# Patient Record
Sex: FEMALE | Race: WHITE | Employment: OTHER | RURAL
[De-identification: names, ages, dates, MRNs, and addresses within clinical notes are randomized per-mention and may not be internally consistent; named-entity substitution may affect disease eponyms.]

---

## 2019-07-18 VITALS
WEIGHT: 176 LBS | HEART RATE: 85 BPM | DIASTOLIC BLOOD PRESSURE: 74 MMHG | BODY MASS INDEX: 32.39 KG/M2 | HEIGHT: 62 IN | SYSTOLIC BLOOD PRESSURE: 130 MMHG

## 2019-07-18 DIAGNOSIS — Z85.118 HISTORY OF LUNG CANCER: ICD-10-CM

## 2019-08-07 LAB
ALBUMIN SERPL-MCNC: 3.8 G/DL
ALP BLD-CCNC: 53 U/L
ALT SERPL-CCNC: 22 U/L
ANION GAP SERPL CALCULATED.3IONS-SCNC: ABNORMAL MMOL/L
AST SERPL-CCNC: 14 U/L
BASOPHILS ABSOLUTE: ABNORMAL /ΜL
BASOPHILS RELATIVE PERCENT: ABNORMAL %
BILIRUB SERPL-MCNC: 0.3 MG/DL (ref 0.1–1.4)
BUN BLDV-MCNC: 23 MG/DL
CALCIUM SERPL-MCNC: 9.4 MG/DL
CHLORIDE BLD-SCNC: 104 MMOL/L
CO2: 29 MMOL/L
CREAT SERPL-MCNC: 1.1 MG/DL
EOSINOPHILS ABSOLUTE: ABNORMAL /ΜL
EOSINOPHILS RELATIVE PERCENT: ABNORMAL %
GFR CALCULATED: 50
GLUCOSE BLD-MCNC: 102 MG/DL
HCT VFR BLD CALC: 36.2 % (ref 36–46)
HEMOGLOBIN: 11.9 G/DL (ref 12–16)
LYMPHOCYTES ABSOLUTE: ABNORMAL /ΜL
LYMPHOCYTES RELATIVE PERCENT: ABNORMAL %
MCH RBC QN AUTO: ABNORMAL PG
MCHC RBC AUTO-ENTMCNC: ABNORMAL G/DL
MCV RBC AUTO: 95 FL
MONOCYTES ABSOLUTE: ABNORMAL /ΜL
MONOCYTES RELATIVE PERCENT: ABNORMAL %
NEUTROPHILS ABSOLUTE: 3.8 /ΜL
NEUTROPHILS RELATIVE PERCENT: ABNORMAL %
PDW BLD-RTO: ABNORMAL %
PLATELET # BLD: 228 K/ΜL
PMV BLD AUTO: ABNORMAL FL
POTASSIUM SERPL-SCNC: 4.2 MMOL/L
RBC # BLD: 3.81 10^6/ΜL
SODIUM BLD-SCNC: 139 MMOL/L
TOTAL PROTEIN: 9.4
WBC # BLD: 5.3 10^3/ML
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
## 2019-08-13 ENCOUNTER — OFFICE VISIT (OUTPATIENT)
Dept: HEMATOLOGY | Age: 69
End: 2019-08-13
Payer: MEDICARE

## 2019-08-13 VITALS
BODY MASS INDEX: 31.65 KG/M2 | HEIGHT: 62 IN | WEIGHT: 172 LBS | DIASTOLIC BLOOD PRESSURE: 80 MMHG | SYSTOLIC BLOOD PRESSURE: 160 MMHG | HEART RATE: 75 BPM | OXYGEN SATURATION: 97 %

## 2019-08-13 DIAGNOSIS — Z00.00 HEALTHCARE MAINTENANCE: ICD-10-CM

## 2019-08-13 DIAGNOSIS — Z85.118 HISTORY OF LUNG CANCER: Primary | ICD-10-CM

## 2019-08-13 DIAGNOSIS — D47.2 MGUS (MONOCLONAL GAMMOPATHY OF UNKNOWN SIGNIFICANCE): ICD-10-CM

## 2019-08-13 PROCEDURE — 99214 OFFICE O/P EST MOD 30 MIN: CPT | Performed by: INTERNAL MEDICINE

## 2019-10-15 ENCOUNTER — TRANSCRIBE ORDERS (OUTPATIENT)
Dept: ADMINISTRATIVE | Facility: HOSPITAL | Age: 69
End: 2019-10-15

## 2019-10-15 ENCOUNTER — OFFICE VISIT (OUTPATIENT)
Dept: HEMATOLOGY | Age: 69
End: 2019-10-15
Payer: MEDICARE

## 2019-10-15 VITALS
BODY MASS INDEX: 32.02 KG/M2 | DIASTOLIC BLOOD PRESSURE: 82 MMHG | WEIGHT: 174 LBS | HEART RATE: 91 BPM | HEIGHT: 62 IN | SYSTOLIC BLOOD PRESSURE: 140 MMHG

## 2019-10-15 DIAGNOSIS — Z85.118 HISTORY OF LUNG CANCER: Primary | ICD-10-CM

## 2019-10-15 DIAGNOSIS — Z08 ENCOUNTER FOR FOLLOW-UP SURVEILLANCE OF LUNG CANCER: ICD-10-CM

## 2019-10-15 DIAGNOSIS — D47.2 MGUS (MONOCLONAL GAMMOPATHY OF UNKNOWN SIGNIFICANCE): ICD-10-CM

## 2019-10-15 DIAGNOSIS — Z00.00 HEALTHCARE MAINTENANCE: ICD-10-CM

## 2019-10-15 DIAGNOSIS — Z85.118 ENCOUNTER FOR FOLLOW-UP SURVEILLANCE OF LUNG CANCER: ICD-10-CM

## 2019-10-15 PROCEDURE — 99213 OFFICE O/P EST LOW 20 MIN: CPT | Performed by: INTERNAL MEDICINE

## 2019-11-01 ENCOUNTER — TRANSCRIBE ORDERS (OUTPATIENT)
Dept: ADMINISTRATIVE | Facility: HOSPITAL | Age: 69
End: 2019-11-01

## 2019-11-01 ENCOUNTER — HOSPITAL ENCOUNTER (OUTPATIENT)
Dept: CT IMAGING | Facility: HOSPITAL | Age: 69
Discharge: HOME | End: 2019-11-01
Admitting: INTERNAL MEDICINE

## 2019-11-01 ENCOUNTER — APPOINTMENT (OUTPATIENT)
Dept: CT IMAGING | Facility: HOSPITAL | Age: 69
End: 2019-11-01

## 2019-11-01 PROCEDURE — G0297 LDCT FOR LUNG CA SCREEN: HCPCS

## 2019-12-11 ENCOUNTER — TELEPHONE (OUTPATIENT)
Dept: HEMATOLOGY | Age: 69
End: 2019-12-11

## 2021-01-14 DIAGNOSIS — Z12.31 SCREENING MAMMOGRAM, ENCOUNTER FOR: ICD-10-CM

## 2021-01-14 DIAGNOSIS — D47.2 MGUS (MONOCLONAL GAMMOPATHY OF UNKNOWN SIGNIFICANCE): ICD-10-CM

## 2021-01-14 DIAGNOSIS — F17.211 NICOTINE DEPENDENCE, CIGARETTES, IN REMISSION: ICD-10-CM

## 2021-01-14 DIAGNOSIS — Z72.0 NICOTINE ABUSE: Primary | ICD-10-CM

## 2021-01-14 DIAGNOSIS — Z85.118 HISTORY OF LUNG CANCER: ICD-10-CM

## 2021-01-15 ENCOUNTER — TELEPHONE (OUTPATIENT)
Dept: INFUSION THERAPY | Age: 71
End: 2021-01-15

## 2021-01-15 ENCOUNTER — TRANSCRIBE ORDERS (OUTPATIENT)
Dept: ADMINISTRATIVE | Facility: HOSPITAL | Age: 71
End: 2021-01-15

## 2021-01-15 DIAGNOSIS — Z85.118 PERSONAL HISTORY OF LUNG CANCER: ICD-10-CM

## 2021-01-15 DIAGNOSIS — Z72.0 TOBACCO ABUSE: Primary | ICD-10-CM

## 2021-01-15 NOTE — TELEPHONE ENCOUNTER
Pt. Called and states that she is overdue for some imaging and blood work that she never had done. She has an appt to see Dr. Rand Mahan in February. Scheduled a CT low dose at [facility] 2/8/21 and a mammogram and blood work at [facility] 2/9/21.

## 2021-02-08 ENCOUNTER — DOCUMENTATION (OUTPATIENT)
Dept: CT IMAGING | Facility: HOSPITAL | Age: 71
End: 2021-02-08

## 2021-02-08 ENCOUNTER — HOSPITAL ENCOUNTER (OUTPATIENT)
Dept: CT IMAGING | Facility: HOSPITAL | Age: 71
Discharge: HOME | End: 2021-02-08
Admitting: INTERNAL MEDICINE

## 2021-02-08 PROCEDURE — 71271 CT THORAX LUNG CANCER SCR C-: CPT
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
## 2021-03-16 ENCOUNTER — OFFICE VISIT (OUTPATIENT)
Dept: HEMATOLOGY | Age: 71
End: 2021-03-16
Payer: MEDICARE

## 2021-03-16 VITALS
DIASTOLIC BLOOD PRESSURE: 78 MMHG | HEIGHT: 62 IN | BODY MASS INDEX: 32.2 KG/M2 | TEMPERATURE: 98.6 F | SYSTOLIC BLOOD PRESSURE: 132 MMHG | OXYGEN SATURATION: 97 % | HEART RATE: 82 BPM | WEIGHT: 175 LBS

## 2021-03-16 DIAGNOSIS — Z85.118 HISTORY OF LUNG CANCER: ICD-10-CM

## 2021-03-16 DIAGNOSIS — Z00.00 HEALTHCARE MAINTENANCE: ICD-10-CM

## 2021-03-16 DIAGNOSIS — D47.2 MGUS (MONOCLONAL GAMMOPATHY OF UNKNOWN SIGNIFICANCE): Primary | ICD-10-CM

## 2021-03-16 PROCEDURE — 99214 OFFICE O/P EST MOD 30 MIN: CPT | Performed by: INTERNAL MEDICINE

## 2021-03-16 RX ORDER — ALBUTEROL SULFATE 90 UG/1
2 AEROSOL, METERED RESPIRATORY (INHALATION) EVERY 6 HOURS PRN
COMMUNITY

## 2021-03-16 RX ORDER — FAMOTIDINE 10 MG
10 TABLET ORAL 2 TIMES DAILY
COMMUNITY

## 2021-09-14 ENCOUNTER — OFFICE VISIT (OUTPATIENT)
Dept: HEMATOLOGY | Age: 71
End: 2021-09-14
Payer: MEDICARE

## 2021-09-14 VITALS — TEMPERATURE: 98.5 F | HEART RATE: 88 BPM | OXYGEN SATURATION: 97 % | WEIGHT: 165 LBS | BODY MASS INDEX: 30.18 KG/M2

## 2021-09-14 DIAGNOSIS — D47.2 MGUS (MONOCLONAL GAMMOPATHY OF UNKNOWN SIGNIFICANCE): ICD-10-CM

## 2021-09-14 DIAGNOSIS — Z71.89 CARE PLAN DISCUSSED WITH PATIENT: ICD-10-CM

## 2021-09-14 DIAGNOSIS — Z85.118 HISTORY OF LUNG CANCER: Primary | ICD-10-CM

## 2021-09-14 PROCEDURE — 99214 OFFICE O/P EST MOD 30 MIN: CPT | Performed by: INTERNAL MEDICINE

## 2021-10-18 ENCOUNTER — TELEPHONE (OUTPATIENT)
Dept: INFUSION THERAPY | Age: 71
End: 2021-10-18

## 2021-10-18 NOTE — TELEPHONE ENCOUNTER
Pt left message concerning a rash. Returned call to pt. She states she has had a rash since the beginning of October, has been to the ER several times, has been to see PCP, Dr. Kamlesh Rosis. She reports rash & sores on her legs, back, etc.. has been using an otc cream recommended by physician, as well as calamine lotion, which she states are helping. Advised pt to continue using these interventions until she sees Dr. Zonia Rosales at [place] next week, and if rash gets worse before then, contact PCP or go to ER. Pt verbalizes understanding.

## 2022-02-07 ENCOUNTER — TRANSCRIBE ORDERS (OUTPATIENT)
Dept: ADMINISTRATIVE | Facility: HOSPITAL | Age: 72
End: 2022-02-07

## 2022-02-07 DIAGNOSIS — Z85.118 HISTORY OF LUNG CANCER: Primary | ICD-10-CM

## 2022-02-14 ENCOUNTER — APPOINTMENT (OUTPATIENT)
Dept: CT IMAGING | Facility: HOSPITAL | Age: 72
End: 2022-02-14

## 2022-04-19 ENCOUNTER — HOSPITAL ENCOUNTER (OUTPATIENT)
Dept: CT IMAGING | Facility: HOSPITAL | Age: 72
Discharge: HOME | End: 2022-04-19
Admitting: INTERNAL MEDICINE

## 2022-04-19 DIAGNOSIS — Z85.118 HISTORY OF LUNG CANCER: ICD-10-CM

## 2022-04-19 PROCEDURE — 71271 CT THORAX LUNG CANCER SCR C-: CPT

## 2022-04-22 ENCOUNTER — APPOINTMENT (OUTPATIENT)
Dept: CT IMAGING | Facility: HOSPITAL | Age: 72
End: 2022-04-22

## 2022-04-22 ENCOUNTER — DOCUMENTATION (OUTPATIENT)
Dept: CT IMAGING | Facility: HOSPITAL | Age: 72
End: 2022-04-22
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
## 2022-04-26 ENCOUNTER — OFFICE VISIT (OUTPATIENT)
Dept: HEMATOLOGY | Age: 72
End: 2022-04-26
Payer: MEDICARE

## 2022-04-26 VITALS
HEART RATE: 83 BPM | BODY MASS INDEX: 30 KG/M2 | WEIGHT: 163 LBS | HEIGHT: 62 IN | DIASTOLIC BLOOD PRESSURE: 80 MMHG | SYSTOLIC BLOOD PRESSURE: 142 MMHG | OXYGEN SATURATION: 96 %

## 2022-04-26 DIAGNOSIS — R53.83 FATIGUE, UNSPECIFIED TYPE: ICD-10-CM

## 2022-04-26 DIAGNOSIS — Z71.89 CARE PLAN DISCUSSED WITH PATIENT: ICD-10-CM

## 2022-04-26 DIAGNOSIS — D47.2 MGUS (MONOCLONAL GAMMOPATHY OF UNKNOWN SIGNIFICANCE): ICD-10-CM

## 2022-04-26 DIAGNOSIS — Z85.118 HISTORY OF LUNG CANCER: Primary | ICD-10-CM

## 2022-04-26 PROCEDURE — 99214 OFFICE O/P EST MOD 30 MIN: CPT | Performed by: INTERNAL MEDICINE
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
## 2022-10-25 ENCOUNTER — OFFICE VISIT (OUTPATIENT)
Dept: HEMATOLOGY | Age: 72
End: 2022-10-25
Payer: MEDICARE

## 2022-10-25 VITALS
OXYGEN SATURATION: 95 % | BODY MASS INDEX: 30.73 KG/M2 | HEART RATE: 91 BPM | WEIGHT: 167 LBS | HEIGHT: 62 IN | SYSTOLIC BLOOD PRESSURE: 126 MMHG | DIASTOLIC BLOOD PRESSURE: 72 MMHG

## 2022-10-25 DIAGNOSIS — K04.7 INFECTED TOOTH: ICD-10-CM

## 2022-10-25 DIAGNOSIS — Z85.118 HISTORY OF LUNG CANCER: ICD-10-CM

## 2022-10-25 DIAGNOSIS — D47.2 MGUS (MONOCLONAL GAMMOPATHY OF UNKNOWN SIGNIFICANCE): Primary | ICD-10-CM

## 2022-10-25 DIAGNOSIS — J32.9 SINUSITIS, UNSPECIFIED CHRONICITY, UNSPECIFIED LOCATION: ICD-10-CM

## 2022-10-25 DIAGNOSIS — Z71.89 CARE PLAN DISCUSSED WITH PATIENT: ICD-10-CM

## 2022-10-25 DIAGNOSIS — Z87.891 STOPPED SMOKING WITH GREATER THAN 40 PACK YEAR HISTORY: ICD-10-CM

## 2022-10-25 PROCEDURE — 99214 OFFICE O/P EST MOD 30 MIN: CPT | Performed by: INTERNAL MEDICINE

## 2022-10-25 PROCEDURE — 1123F ACP DISCUSS/DSCN MKR DOCD: CPT | Performed by: INTERNAL MEDICINE

## 2022-10-25 RX ORDER — AMOXICILLIN AND CLAVULANATE POTASSIUM 875; 125 MG/1; MG/1
1 TABLET, FILM COATED ORAL 2 TIMES DAILY
Qty: 14 TABLET | Refills: 0 | Status: SHIPPED | OUTPATIENT
Start: 2022-10-25 | End: 2022-11-01

## 2022-10-26 ENCOUNTER — TRANSCRIBE ORDERS (OUTPATIENT)
Dept: ADMINISTRATIVE | Facility: HOSPITAL | Age: 72
End: 2022-10-26

## 2022-10-26 DIAGNOSIS — D47.2 MGUS (MONOCLONAL GAMMOPATHY OF UNKNOWN SIGNIFICANCE): Primary | ICD-10-CM

## 2022-11-02 ENCOUNTER — TELEPHONE (OUTPATIENT)
Dept: HEMATOLOGY | Age: 72
End: 2022-11-02

## 2022-11-02 NOTE — TELEPHONE ENCOUNTER
At 10/25/22 office visit with Dr Chelle Arguello, he recommended to do yearly CT lung cancer screening in Feb 2023. Sisi/front office tried to sched CT, but was told Medicare wouldn't pay for it due to pt quitting smoking over 15 years ago. Discussed with Dr Chelle Arguello who agreed and stated if symptoms arise, a normal CT chest could be done at that time if needed. Call to pt to explain above. Pt voiced understanding.

## 2023-04-24 NOTE — PROGRESS NOTES
WBC 4.1, Platelets 666C. Cr 1.3  10/20/22- IgA 47, IgG 3214, IgM 40  4/18/23 MM Labs ([facility]):[place] 53.7,Lambda 9.3,Kappa/Lambda 5.77, IgA 35, IgG 3142, IgM 38, M-Oscar 2.4, calcium 9.7, creatinine 1.0, Hb 11.8    Plan:  -Repeat SPEP, CMP, CBC, free light chains, immunoglobulins 6 months    Small cell lung cancer stage II. Follow-up CT of the chest performed on 11/3/2015 was negative. She denies any pulmonary symptoms, including hemoptysis. CXR negative 11/07/2017. February 2021-CT low-dose lung cancer screening was reviewed and showed no evidence of lung malignancy. Repeat CT low-dose in 1 year. 4/19/2022 CT Chest Low Dose ([facility])No new or enlarging pulmonary nodules. Moderate emphysema. Stable posttreatment change in the LEFT upper chest. Stable dilatation of main pulmonary artery and ectasia of ascending aorta. Quit smoking 2006. No more CT low-dose lung cancer screening. She quit smoking 2006. Health Maintenance: The patient is encouraged to follow-up with primary care regularly for further recommendations regarding age appropriated screening for cancer, well-being visit (preventative  measures), follow-up and treatment of other medical comorbidities. Colonoscopy was completed in November, 2015 per Dr. Oscar Zee and no signs of cancer on a limited study. PLAN:  RTC 6 months   No more CT low-dose lung cancer screening. She quit smoking 2006. SPEP, free light chains, immunoglobulins today  CBC, SPEP, immunoglobulins, FLC, CMP to be repeated in 6-month prior to visit      No follow-ups on file. Data Moe Ramos am pre charting  as Medical Assistant for Elroy De La Vega MD. Electronically signed by Baldemar Alas MA on 4/25/2023 at 3:15 PM CDT. Senthil Christina am scribing for Elroy De La Vega MD. Electronically signed by Julieta Singleton RN on 4/25/2023 at 3:04 PM CDT.     I, Dr Viv Melissa, personally performed the services described in this documentation as scribed by

## 2023-04-25 ENCOUNTER — OFFICE VISIT (OUTPATIENT)
Dept: HEMATOLOGY | Age: 73
End: 2023-04-25
Payer: MEDICARE

## 2023-04-25 VITALS
HEART RATE: 86 BPM | OXYGEN SATURATION: 95 % | DIASTOLIC BLOOD PRESSURE: 62 MMHG | WEIGHT: 161 LBS | BODY MASS INDEX: 29.63 KG/M2 | HEIGHT: 62 IN | SYSTOLIC BLOOD PRESSURE: 124 MMHG

## 2023-04-25 DIAGNOSIS — D47.2 MGUS (MONOCLONAL GAMMOPATHY OF UNKNOWN SIGNIFICANCE): ICD-10-CM

## 2023-04-25 DIAGNOSIS — Z71.89 CARE PLAN DISCUSSED WITH PATIENT: ICD-10-CM

## 2023-04-25 DIAGNOSIS — Z85.118 HISTORY OF LUNG CANCER: Primary | ICD-10-CM

## 2023-04-25 PROCEDURE — 1123F ACP DISCUSS/DSCN MKR DOCD: CPT | Performed by: INTERNAL MEDICINE

## 2023-04-25 PROCEDURE — 99213 OFFICE O/P EST LOW 20 MIN: CPT | Performed by: INTERNAL MEDICINE

## 2023-04-26 DIAGNOSIS — D47.2 MGUS (MONOCLONAL GAMMOPATHY OF UNKNOWN SIGNIFICANCE): Primary | ICD-10-CM

## 2023-09-15 ENCOUNTER — TELEPHONE (OUTPATIENT)
Dept: HEMATOLOGY | Age: 73
End: 2023-09-15

## 2023-09-15 NOTE — TELEPHONE ENCOUNTER
Called and spoke with patient related to the facility not taking BCBS as of Oct 1. Patient educated to call Nina Alejandro and request jas to continue care with MD, and notified of open enrollment on Oct 15th. Patient verbalized understanding and had no further questions at this time.  Electronically signed by Kelsy Rincon RN on 9/15/2023 at 12:05 PM

## 2024-01-15 NOTE — PROGRESS NOTES
4/11/06  CT guided biopsy of the lung lesions:  Small cell carcinoma. The metastatic workup was otherwise negative. She received induction chemotherapy with Cisplatin and -16 and completed 6 cycles of this. She also received primary radiation therapy to the chest as well as prophylactic cranial irradiation.   4/19/06 -8/16/06 Cisplatin and VP16 x 6 courses between   12/18/06 Completed radiation therapy to the left chest and mediastinum in 28 fractions as well as PCI (2600 cGy)  June 2007 PRIMARY TUMOR RECURRENCE: Local recurrence limited SALUD She was watched closely thereafter and eventually on f/u there was a progressive SALUD nodule outside of the radiation therapy field that continued to grow and was positive on PET scan on 06/27/07 and therefore she underwent further radiation therapy and chemotherapy with a subsequent 6 cycles of Cisplatin and -16 were delivered and completed on 12/11/07.  7/5/07 - 9/12/07 Additional radiation therapy with 6040 cGy to the left upper lung field in 33 treatment fractions   7/11/07-12/11/07 Progression in the left upper lung treated with Cisplatin and VP16 x 6 courses   2/8/21 CT low dose chest:  Post treatment change in the left chest. Follow-up with unenhanced CT of the chest in 12 months Lung rads category 1 no new nodules are identified.    4/19/2022 CT Chest Low Dose ([facility])No new or enlarging pulmonary nodules. Moderate emphysema. Stable posttreatment change in the LEFT upper chest. Stable dilatation of main pulmonary artery and ectasia of ascending aorta.       #1 HEMATOLOGY HISTORY:  In continued monitoring post treatment of her CBCs, Ariana has been found to have a persistently low hemoglobin in the 10-11 range. She has had upper and lower endoscopies that have been negative in the past. She does have a degree of renal insufficiency, which may be contributing. Serological workup was unrevealing and she is continued to be monitored with improvement and normalization of

## 2024-01-16 ENCOUNTER — TELEMEDICINE (OUTPATIENT)
Dept: HEMATOLOGY | Age: 74
End: 2024-01-16
Payer: MEDICARE

## 2024-01-16 DIAGNOSIS — Z71.89 CARE PLAN DISCUSSED WITH PATIENT: ICD-10-CM

## 2024-01-16 DIAGNOSIS — D47.2 MGUS (MONOCLONAL GAMMOPATHY OF UNKNOWN SIGNIFICANCE): ICD-10-CM

## 2024-01-16 DIAGNOSIS — E87.1 HYPONATREMIA: Primary | ICD-10-CM

## 2024-01-16 DIAGNOSIS — Z85.118 HISTORY OF PRIMARY SMALL CELL CARCINOMA OF LUNG: ICD-10-CM

## 2024-01-16 DIAGNOSIS — Z71.85 IMMUNIZATION COUNSELING: ICD-10-CM

## 2024-01-16 DIAGNOSIS — D47.2 MGUS (MONOCLONAL GAMMOPATHY OF UNKNOWN SIGNIFICANCE): Primary | ICD-10-CM

## 2024-01-16 PROCEDURE — 99442 PR PHYS/QHP TELEPHONE EVALUATION 11-20 MIN: CPT | Performed by: INTERNAL MEDICINE

## 2024-01-16 RX ORDER — TRAMADOL HYDROCHLORIDE 50 MG/1
50 TABLET ORAL EVERY 6 HOURS PRN
COMMUNITY

## 2024-03-13 NOTE — PROGRESS NOTES
CBC, SPEP, immunoglobulins, FLC, CMP orders entered faxed to hospital and mailed to patient. Electronically signed by Raina Jones RN on 1/16/2024 at 11:07 AM     [Joint Pain] : joint pain [Negative] : Endocrine

## 2024-07-12 ENCOUNTER — TELEPHONE (OUTPATIENT)
Dept: HEMATOLOGY | Age: 74
End: 2024-07-12

## 2024-07-12 NOTE — TELEPHONE ENCOUNTER
Called Patient and reminded patient of their appointment on 07/16/2024 and patient confirmed they would be here. Reminded patient to just come at appointment time, and to not come at the lab appointment time. Reminded patient that we will not check them in any more than 30 minutes before appointment time

## 2024-07-15 NOTE — PROGRESS NOTES
also received primary radiation therapy to the chest as well as prophylactic cranial irradiation.   4/19/06 -8/16/06 Cisplatin and VP16 x 6 courses between   12/18/06 Completed radiation therapy to the left chest and mediastinum in 28 fractions as well as PCI (2600 cGy)  June 2007 PRIMARY TUMOR RECURRENCE: Local recurrence limited SALUD She was watched closely thereafter and eventually on f/u there was a progressive SALUD nodule outside of the radiation therapy field that continued to grow and was positive on PET scan on 06/27/07 and therefore she underwent further radiation therapy and chemotherapy with a subsequent 6 cycles of Cisplatin and -16 were delivered and completed on 12/11/07.  7/5/07 - 9/12/07 Additional radiation therapy with 6040 cGy to the left upper lung field in 33 treatment fractions   7/11/07-12/11/07 Progression in the left upper lung treated with Cisplatin and VP16 x 6 courses   2/8/21 CT low dose chest:  Post treatment change in the left chest. Follow-up with unenhanced CT of the chest in 12 months Lung rads category 1 no new nodules are identified.    4/19/2022 CT Chest Low Dose ([facility])No new or enlarging pulmonary nodules. Moderate emphysema. Stable posttreatment change in the LEFT upper chest. Stable dilatation of main pulmonary artery and ectasia of ascending aorta.       #1 HEMATOLOGY HISTORY:  In continued monitoring post treatment of her CBCs, Ariana has been found to have a persistently low hemoglobin in the 10-11 range. She has had upper and lower endoscopies that have been negative in the past. She does have a degree of renal insufficiency, which may be contributing. Serological workup was unrevealing and she is continued to be monitored with improvement and normalization of her hemoglobin.  #2 HEMATOLOGY HISTORY:  IgG kappa MGUS, 05/13/14 5/13/14 Labs at her routine office visit revealed a mildly elevated total protein of 9.0, along with a creatinine of 1.4. This was followed by

## 2024-07-16 ENCOUNTER — OFFICE VISIT (OUTPATIENT)
Dept: HEMATOLOGY | Age: 74
End: 2024-07-16
Payer: MEDICARE

## 2024-07-16 VITALS
HEART RATE: 81 BPM | BODY MASS INDEX: 24.48 KG/M2 | SYSTOLIC BLOOD PRESSURE: 122 MMHG | OXYGEN SATURATION: 99 % | HEIGHT: 62 IN | DIASTOLIC BLOOD PRESSURE: 60 MMHG | WEIGHT: 133 LBS

## 2024-07-16 DIAGNOSIS — Z71.89 CARE PLAN DISCUSSED WITH PATIENT: ICD-10-CM

## 2024-07-16 DIAGNOSIS — D47.2 MGUS (MONOCLONAL GAMMOPATHY OF UNKNOWN SIGNIFICANCE): ICD-10-CM

## 2024-07-16 DIAGNOSIS — Z85.118 HISTORY OF PRIMARY SMALL CELL CARCINOMA OF LUNG: Primary | ICD-10-CM

## 2024-07-16 PROCEDURE — 1123F ACP DISCUSS/DSCN MKR DOCD: CPT | Performed by: INTERNAL MEDICINE

## 2024-07-16 PROCEDURE — 99213 OFFICE O/P EST LOW 20 MIN: CPT | Performed by: INTERNAL MEDICINE

## 2024-07-16 PROCEDURE — G2211 COMPLEX E/M VISIT ADD ON: HCPCS | Performed by: INTERNAL MEDICINE

## 2024-07-16 RX ORDER — ESCITALOPRAM OXALATE 10 MG/1
10 TABLET ORAL DAILY
COMMUNITY
Start: 2024-07-01
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
## 2025-07-08 DIAGNOSIS — D47.2 MGUS (MONOCLONAL GAMMOPATHY OF UNKNOWN SIGNIFICANCE): Primary | ICD-10-CM

## 2025-07-11 ENCOUNTER — TELEPHONE (OUTPATIENT)
Dept: HEMATOLOGY | Age: 75
End: 2025-07-11

## 2025-07-11 NOTE — TELEPHONE ENCOUNTER
I called patient and reminded patient of their appt on 07/15/2025 at the [facility] in [facility]. Patient knows they need to get bloodwork 2 days before their appointment so that the provider will have lab results back in time. Patient voiced understanding and confirmed they would be there. I also made patient aware that when they go to have labs drawn, that our labs are not fasting labs and they are allowed to eat but we ask that they also drink plenty of water to hydrate their veins before the lab draw appointment to help make the lab draw easier for them and the phlebotomist. Made patient aware that if they had any questions or could not make the appointment, to please call our office in [place] at [phone]. I advised patient that starting June 9th, our office will be implementing a phone tree system when they call our office to please listen to all prompts and select the prompt they need and leave a voicemail if no one answers and someone in office will return their call before the end of the business day. I also made them aware to please not leave multiple voicemails because this will cause more delay in returning their call in a timely manner.    Pt stated that she has had labs done this week.

## 2025-08-22 ENCOUNTER — TELEPHONE (OUTPATIENT)
Dept: HEMATOLOGY | Age: 75
End: 2025-08-22

## 2025-08-26 ENCOUNTER — OFFICE VISIT (OUTPATIENT)
Dept: HEMATOLOGY | Age: 75
End: 2025-08-26
Payer: MEDICARE

## 2025-08-26 VITALS
DIASTOLIC BLOOD PRESSURE: 82 MMHG | TEMPERATURE: 97.7 F | HEART RATE: 77 BPM | BODY MASS INDEX: 20.67 KG/M2 | SYSTOLIC BLOOD PRESSURE: 128 MMHG | WEIGHT: 113 LBS | OXYGEN SATURATION: 100 %

## 2025-08-26 DIAGNOSIS — Z71.89 CARE PLAN DISCUSSED WITH PATIENT: ICD-10-CM

## 2025-08-26 DIAGNOSIS — Z85.118 HISTORY OF PRIMARY SMALL CELL CARCINOMA OF LUNG: ICD-10-CM

## 2025-08-26 DIAGNOSIS — D64.9 NORMOCYTIC ANEMIA: ICD-10-CM

## 2025-08-26 DIAGNOSIS — D47.2 MGUS (MONOCLONAL GAMMOPATHY OF UNKNOWN SIGNIFICANCE): Primary | ICD-10-CM

## 2025-08-26 PROCEDURE — 1123F ACP DISCUSS/DSCN MKR DOCD: CPT | Performed by: NURSE PRACTITIONER

## 2025-08-26 PROCEDURE — 1159F MED LIST DOCD IN RCRD: CPT | Performed by: NURSE PRACTITIONER

## 2025-08-26 PROCEDURE — 99214 OFFICE O/P EST MOD 30 MIN: CPT | Performed by: NURSE PRACTITIONER

## 2025-08-26 PROCEDURE — 1126F AMNT PAIN NOTED NONE PRSNT: CPT | Performed by: NURSE PRACTITIONER

## 2025-08-26 ASSESSMENT — ENCOUNTER SYMPTOMS
CONSTIPATION: 0
SHORTNESS OF BREATH: 0
VOMITING: 0
WHEEZING: 0
SORE THROAT: 0
EYE ITCHING: 0
COUGH: 0
EYE DISCHARGE: 0
NAUSEA: 0
TROUBLE SWALLOWING: 0
DIARRHEA: 0
ABDOMINAL PAIN: 0